# Patient Record
Sex: FEMALE | Race: WHITE | NOT HISPANIC OR LATINO | URBAN - NONMETROPOLITAN AREA
[De-identification: names, ages, dates, MRNs, and addresses within clinical notes are randomized per-mention and may not be internally consistent; named-entity substitution may affect disease eponyms.]

---

## 2018-08-06 PROBLEM — Z01.01: Noted: 2018-08-06

## 2018-08-06 PROBLEM — Z46.0: Noted: 2018-08-06

## 2018-08-06 PROBLEM — H25.813: Noted: 2018-08-06

## 2018-08-06 PROBLEM — H52.13: Noted: 2018-08-06

## 2020-06-01 ENCOUNTER — IMPORTED ENCOUNTER (OUTPATIENT)
Dept: URBAN - NONMETROPOLITAN AREA CLINIC 1 | Facility: CLINIC | Age: 50
End: 2020-06-01

## 2020-06-01 PROCEDURE — S0621 ROUTINE OPHTHALMOLOGICAL EXA: HCPCS

## 2020-06-01 PROCEDURE — 92310 CONTACT LENS FITTING OU: CPT

## 2020-06-01 NOTE — PATIENT DISCUSSION
Myopia-Discussed diagnosis with patient. -Explained that people who are myopic are at a higher risk for developing RD/RT and reviewed associated S&S.-Pt to contact our office if symptoms develop. Updated spec Rx given. Recommend lens that will provide comfort as well as protect safety and health of eyes. CL wear-CLs fit and center well.-Stressed that patient should not sleep in CL. -Updated CL Rx given. -CL care and precautions given. Cataract OU-Reviewed symptoms of advancing cataract growth such as glare and halos and decreased vision.-Continue to monitor for now. Pt will notify us if any new symptoms develop.

## 2021-08-02 ENCOUNTER — IMPORTED ENCOUNTER (OUTPATIENT)
Dept: URBAN - NONMETROPOLITAN AREA CLINIC 1 | Facility: CLINIC | Age: 51
End: 2021-08-02

## 2021-08-02 PROBLEM — H52.13: Noted: 2021-08-02

## 2021-08-02 PROBLEM — H25.813: Noted: 2021-08-02

## 2021-08-02 PROBLEM — H52.4: Noted: 2021-08-02

## 2021-08-02 PROCEDURE — S0621 ROUTINE OPHTHALMOLOGICAL EXA: HCPCS

## 2021-08-02 PROCEDURE — 92310 CONTACT LENS FITTING OU: CPT

## 2021-08-02 NOTE — PATIENT DISCUSSION
Simple Myopia OU w/Presbyopia-  discussed findings w/patient-  new spectacle/CL Rx issued-  continue to monitor yearly or prnCataracts OU-  discussed findings w/patient-  no treatment indicated -  UV protection recommended-  continue to monitor yearly or prn; 's Notes: MR 8/2/2021DFE defer

## 2022-04-09 ASSESSMENT — TONOMETRY
OD_IOP_MMHG: 15
OS_IOP_MMHG: 15
OS_IOP_MMHG: 16
OD_IOP_MMHG: 15

## 2022-04-09 ASSESSMENT — VISUAL ACUITY
OD_SC: 20/30
OS_SC: 20/40
OU_CC: 20/20
OS_SC: 20/25
OD_SC: 20/20
OU_SC: 20/20

## 2023-01-17 ENCOUNTER — ESTABLISHED PATIENT (OUTPATIENT)
Dept: RURAL CLINIC 1 | Facility: CLINIC | Age: 53
End: 2023-01-17

## 2023-01-17 DIAGNOSIS — H52.13: ICD-10-CM

## 2023-01-17 DIAGNOSIS — H52.4: ICD-10-CM

## 2023-01-17 DIAGNOSIS — H25.813: ICD-10-CM

## 2023-01-17 PROCEDURE — 92310-E CONTACT LENS FITTING ESTABLISH PATIENT

## 2023-01-17 PROCEDURE — 92015 DETERMINE REFRACTIVE STATE: CPT

## 2023-01-17 PROCEDURE — 92014 COMPRE OPH EXAM EST PT 1/>: CPT

## 2023-01-17 ASSESSMENT — VISUAL ACUITY
OD_CC: 20/30
OS_CC: 20/40
OD_CC: 20/40-1

## 2023-01-17 ASSESSMENT — TONOMETRY
OD_IOP_MMHG: 12
OS_IOP_MMHG: 12

## 2024-04-17 ENCOUNTER — ESTABLISHED PATIENT (OUTPATIENT)
Dept: RURAL CLINIC 1 | Facility: CLINIC | Age: 54
End: 2024-04-17

## 2024-04-17 DIAGNOSIS — H25.813: ICD-10-CM

## 2024-04-17 DIAGNOSIS — H52.13: ICD-10-CM

## 2024-04-17 DIAGNOSIS — H52.4: ICD-10-CM

## 2024-04-17 PROCEDURE — 92014 COMPRE OPH EXAM EST PT 1/>: CPT

## 2024-04-17 PROCEDURE — 92015 DETERMINE REFRACTIVE STATE: CPT

## 2024-04-17 PROCEDURE — 92310-E CONTACT LENS FITTING ESTABLISH PATIENT

## 2024-04-17 ASSESSMENT — TONOMETRY
OS_IOP_MMHG: 15
OD_IOP_MMHG: 15

## 2024-04-17 ASSESSMENT — VISUAL ACUITY
OD_CC: 20/30-2
OD_CC: 20/25
OS_CC: 20/20
OS_CC: 20/30-

## 2025-04-21 ENCOUNTER — COMPREHENSIVE EXAM (OUTPATIENT)
Age: 55
End: 2025-04-21

## 2025-04-21 DIAGNOSIS — H52.4: ICD-10-CM

## 2025-04-21 DIAGNOSIS — H25.813: ICD-10-CM

## 2025-04-21 DIAGNOSIS — H52.13: ICD-10-CM

## 2025-04-21 PROCEDURE — 92310-1 LEVEL 1 SOFT LENS UPDATE

## 2025-04-21 PROCEDURE — 92014 COMPRE OPH EXAM EST PT 1/>: CPT

## 2025-04-21 PROCEDURE — 92015 DETERMINE REFRACTIVE STATE: CPT
